# Patient Record
Sex: MALE | Race: WHITE | NOT HISPANIC OR LATINO | Employment: FULL TIME | URBAN - NONMETROPOLITAN AREA
[De-identification: names, ages, dates, MRNs, and addresses within clinical notes are randomized per-mention and may not be internally consistent; named-entity substitution may affect disease eponyms.]

---

## 2018-01-15 ENCOUNTER — OFFICE VISIT CONVERTED (OUTPATIENT)
Dept: FAMILY MEDICINE CLINIC | Age: 63
End: 2018-01-15
Attending: NURSE PRACTITIONER

## 2018-11-23 ENCOUNTER — OFFICE VISIT CONVERTED (OUTPATIENT)
Dept: FAMILY MEDICINE CLINIC | Age: 63
End: 2018-11-23
Attending: NURSE PRACTITIONER

## 2018-11-27 ENCOUNTER — OFFICE VISIT CONVERTED (OUTPATIENT)
Dept: FAMILY MEDICINE CLINIC | Age: 63
End: 2018-11-27
Attending: NURSE PRACTITIONER

## 2019-02-15 ENCOUNTER — HOSPITAL ENCOUNTER (OUTPATIENT)
Dept: OTHER | Facility: HOSPITAL | Age: 64
Discharge: HOME | End: 2019-02-15
Attending: NURSE PRACTITIONER

## 2019-02-15 ENCOUNTER — OFFICE VISIT CONVERTED (OUTPATIENT)
Dept: FAMILY MEDICINE CLINIC | Age: 64
End: 2019-02-15
Attending: NURSE PRACTITIONER

## 2019-02-17 LAB — BACTERIA SPEC AEROBE CULT: NORMAL

## 2020-12-22 ENCOUNTER — HOSPITAL ENCOUNTER (OUTPATIENT)
Dept: OTHER | Facility: HOSPITAL | Age: 65
Discharge: HOME | End: 2020-12-22
Attending: PHYSICIAN ASSISTANT

## 2021-01-11 ENCOUNTER — OFFICE VISIT CONVERTED (OUTPATIENT)
Dept: NEUROSURGERY | Facility: CLINIC | Age: 66
End: 2021-01-11
Attending: PHYSICIAN ASSISTANT

## 2021-02-08 ENCOUNTER — OFFICE VISIT CONVERTED (OUTPATIENT)
Dept: NEUROSURGERY | Facility: CLINIC | Age: 66
End: 2021-02-08
Attending: PHYSICIAN ASSISTANT

## 2021-03-16 ENCOUNTER — HOSPITAL ENCOUNTER (OUTPATIENT)
Dept: GENERAL RADIOLOGY | Facility: HOSPITAL | Age: 66
Discharge: HOME | End: 2021-03-16
Attending: PHYSICIAN ASSISTANT

## 2021-03-16 ENCOUNTER — OFFICE VISIT CONVERTED (OUTPATIENT)
Dept: NEUROSURGERY | Facility: CLINIC | Age: 66
End: 2021-03-16
Attending: PHYSICIAN ASSISTANT

## 2021-04-08 ENCOUNTER — HOSPITAL ENCOUNTER (OUTPATIENT)
Dept: OTHER | Facility: HOSPITAL | Age: 66
Discharge: HOME | End: 2021-04-08
Attending: PHYSICIAN ASSISTANT

## 2021-04-16 ENCOUNTER — HOSPITAL ENCOUNTER (OUTPATIENT)
Dept: OTHER | Facility: HOSPITAL | Age: 66
Discharge: HOME | End: 2021-04-16
Attending: PHYSICIAN ASSISTANT

## 2021-04-16 ENCOUNTER — OFFICE VISIT CONVERTED (OUTPATIENT)
Dept: NEUROSURGERY | Facility: CLINIC | Age: 66
End: 2021-04-16
Attending: PHYSICIAN ASSISTANT

## 2021-05-10 NOTE — H&P
History and Physical      Patient Name: Med Forbes   Patient ID: [id number]   Sex: Male   YOB: 1955    Referring Provider: Austen Sadler PA-C    Visit Date: January 11, 2021    Provider: Domonique Selby PA-C   Location: [facility]   Location Address: [address] [place] [address]  [id number]   Location Phone: [phone]          Chief Complaint  · Back pain      History Of Present Illness  The patient is a 65 year old /White male, who presents on referral from Austen Sadler PA-C, for a neurosurgical evaluation L1 transverse process fracture and age indeterminate L1 compression deformity s/p w/c injury. He fell off of a platform around 4-5 inches off the ground on 12/16/20. He stuck his left hand out and rotated and landed on the left side of his body. He has had back pain since that time. He has been off work since the date of the injury. Pain is currently a 5/10 and the rib pain is around a 5/10. Denies leg pain or weakness. Has some paresthesias in the the top of the right foot.   He denies weakness, changes in sensation in the legs, and bladder/bowel dysfunction. The patient has no prior history of neck or back surgery.   RECENT INTERVENTIONS:  He has been previously treated with pain medication.   INFORMATION REVIEWED:  The following information was reviewed: radiology reports and images. CT chest from [facility] from 12/16/20 revealed left lateral 11 the rib fracture, left L1 transverse process fractures, and age indeterminate L1 compression deformity. These were the most notable findings.       Past Medical History  Arthritis; Heart attack; High blood pressure; High cholesterol; Leg pain; Leg swelling; Low back pain; Thyroid Disease         Past Surgical History  heart surgery         Medication List  aspirin 325 mg oral tablet; citalopram 40 mg oral tablet; gabapentin 300 mg oral capsule; levothyroxine 150 mcg oral tablet; lisinopril 20 mg  "oral tablet; methocarbamol 500 mg oral tablet         Allergy List  NO KNOWN DRUG ALLERGIES       Allergies Reconciled  Family Medical History  Heart Disease; Arthrtis         Social History  Tobacco (Former)         Review of Systems  · Constitutional  o Admits  o : fatigue  o Denies  o : chills, excessive sweating, fever, sycope/passing out, weight gain, weight loss  · Eyes  o Denies  o : changes in vision, blurry vision, double vision  · HENT  o Admits  o : nasal congestion, sinus pain  o Denies  o : loss of hearing, ringing in the ears, ear aches, sore throat, nose bleeds, seasonal allergies  · Cardiovascular  o Admits  o : swollen legs  o Denies  o : blood clots, anemia, easy burising or bleeding, transfusions  · Respiratory  o Denies  o : shortness of breath, dry cough, productive cough, pneumonia, COPD  · Gastrointestinal  o Denies  o : difficulty swallowing, reflux  · Genitourinary  o Denies  o : incontinence  · Neurologic  o Admits  o : headache, stroke, falls, difficulty with sleep, numbness/tingling/paresthesia  o Denies  o : seizure, tremor, loss of balance, dizziness/vertigo, difficulty with coordination, difficulty with dexterity, weakness  · Musculoskeletal  o Admits  o : neck stiffness/pain, joint pain, low back pain  o Denies  o : swollen lymph nodes, muscle aches, weakness, spasms, sciatica, pain radiating in arm, pain radiating in leg  · Endocrine  o Admits  o : thyroid disorder  o Denies  o : diabetes  · Psychiatric  o Admits  o : anxiety, depression  · All Others Negative      Vitals  Date Time BP Position Site L\R Cuff Size HR RR TEMP (F) WT  HT  BMI kg/m2 BSA m2 O2 Sat FR L/min FiO2        01/11/2021 08:24 AM        96.7 209lbs 1oz 5'  9\" 30.87 2.15             Physical Examination  · Constitutional  o Appearance  o : well-nourished, well developed, alert, in no acute distress  · Respiratory  o Respiratory Effort  o : breathing unlabored  · Cardiovascular  o Peripheral Vascular System  o : "   § Extremities  § : no edema or cyanosis  · Musculoskeletal  o Spine  o :   § Inspection/Palpation  § : no spinal tenderness or misalignment  o Right Lower Extremity  o :   § Inspection/Palpation  § : no joint or limb tenderness to palpation, no edema present, no ecchymosis  § Joint Stability  § : joint stability within normal limits  § Range of Motion  § : range of motion normal, no joint crepitations present, no pain on motion, Dimitrios's test negative  o Left Lower Extremity  o :   § Inspection/Palpation  § : no joint or limb tenderness to palpation, no edema present, no ecchymosis  § Joint Stability  § : joint stability within normal limits  § Range of Motion  § : range of motion normal, no joint crepitations present, no pain on motion, Idmitrios's test negative  · Skin and Subcutaneous Tissue  o Extremities  o :   § Right Lower Extremity  § : no lesions or areas of discoloration  § Left Lower Extremity  § : no lesions or areas of discoloration  o Back  o : no lesions or areas of discoloration  · Neurologic  o Mental Status Examination  o :   § Orientation  § : alert and oriented to time, person, place and events  o Motor Examination  o :   § RLE Strength  § : strength normal  § RLE Motor Function  § : tone normal, no atrophy, no abnormal movements noted  § LLE Strength  § : strength normal  § LLE Motor Function  § : tone normal, no atrophy, no abnormal movements noted  o Reflexes  o :   § RLE  § : knee and ankle reflexes 2/4, SLR negative  § LLE  § : knee and ankle reflexes 2/4, SLR negative  o Sensation  o :   § Light Touch  § : sensation intact to light touch in extremities  o Gait and Station  o :   § Gait Screening  § : slow and using a cane  · Psychiatric  o Mood and Affect  o : mood normal, affect appropriate          Assessment  · Lumbago/low back pain     724.3/M54.40  · Lumbar compression fracture     805.4/S32.000A  L1 old vs new  · Lumbar transverse process fracture, closed, initial  encounter     805.4/S32.009A  left L1  · Rib fracture     807.00/S22.39XA  left 11th lateral  · Fall     E888.9/W19.XXXA      Plan  · Orders  o MRI lumbar spine wo contrast (77977) - 724.3/M54.40, 805.4/S32.009A, 805.4/S32.000A, E888.9/W19.XXXA - 01/11/2021   Flaget pleae per patient request. old vs new L1 compression deformity with known L1 left transverse process fracture s/p fall on 12/16/20  · Medications  o Medications have been Reconciled  o Transition of Care or Provider Policy  · Instructions  o Encouraged to follow-up with Primary Care Provider for preventative care.  o The ROS and the PFSH were reviewed at today's visit.  o Call or return to office if symptoms worsen or persist.  o This is a w/c injury. He has an old vs new L1 compression fracture and I would like a MRI lumbar spine to determine age of the fracture. He has a left L1 transverse process fracture which does not require bracing. He has a 11th lateral rib fracture. Off work until f/u visit after MRI lumbar spine. I will contact his  with update. He will be off work a minimum of 4 more weeks.   · Associate Tasks  o Task ID [id number] General Task: fax office note and work note to             Electronically Signed by: Domonique Selby PA-C -Author on January 11, 2021 10:53:49 AM

## 2021-05-13 ENCOUNTER — TRANSCRIBE ORDERS (OUTPATIENT)
Dept: ADMINISTRATIVE | Facility: HOSPITAL | Age: 66
End: 2021-05-13

## 2021-05-13 DIAGNOSIS — S63.611A SPRAIN OF LEFT INDEX FINGER, UNSPECIFIED SITE OF DIGIT, INITIAL ENCOUNTER: Primary | ICD-10-CM

## 2021-05-14 ENCOUNTER — HOSPITAL ENCOUNTER (OUTPATIENT)
Dept: OTHER | Facility: HOSPITAL | Age: 66
Discharge: HOME | End: 2021-05-14
Attending: PHYSICIAN ASSISTANT

## 2021-05-14 ENCOUNTER — OFFICE VISIT CONVERTED (OUTPATIENT)
Dept: NEUROSURGERY | Facility: CLINIC | Age: 66
End: 2021-05-14
Attending: PHYSICIAN ASSISTANT

## 2021-05-14 VITALS — WEIGHT: 216.06 LBS | TEMPERATURE: 98.1 F | BODY MASS INDEX: 32 KG/M2 | HEIGHT: 69 IN

## 2021-05-14 VITALS — BODY MASS INDEX: 30.96 KG/M2 | HEIGHT: 69 IN | WEIGHT: 209.06 LBS | TEMPERATURE: 96.7 F

## 2021-05-14 VITALS — TEMPERATURE: 96.9 F | BODY MASS INDEX: 30.69 KG/M2 | HEIGHT: 69 IN | WEIGHT: 207.19 LBS

## 2021-05-14 VITALS — TEMPERATURE: 97.2 F | BODY MASS INDEX: 31.12 KG/M2 | WEIGHT: 210.12 LBS | HEIGHT: 69 IN

## 2021-05-14 NOTE — PROGRESS NOTES
Progress Note      Patient Name: Med Forbes   Patient ID: [id number]   Sex: Male   YOB: 1955    Referring Provider: Austen Sadler PA-C    Visit Date: March 16, 2021    Provider: Domonique Selby PA-C   Location: [facility] [place] [facility]   Location Address: [address] [place] [address]  [id number]   Location Phone: [phone]          Chief Complaint     Follow up with lumbar x-ray done at [facility].       History Of Present Illness     Here for f/u for his w/c injury.  He has a L1 compression fracture and left L1 and L2 transverse process fractures s/p a fall in December 2020 while at work.  Xrays of lumbar spine from today showed a stable L1 compression fracture.  He has been tolerating his TLSO brace and wearing it when out of bed.  He has been back to work with restrictions, but wearing his brace and worked for a few days then pain got worse and he was sent home from work by the nurse. He went back to work last Monday.  Pain is still around a 6-7/10. He has been in brace only around one week since he had Covid and couldn't get the brace until recently.       Past Medical History  Arthritis; Heart attack; High blood pressure; High cholesterol; Leg pain; Leg swelling; Low back pain; Thyroid Disease         Past Surgical History  heart surgery         Medication List  aspirin 325 mg oral tablet; citalopram 40 mg oral tablet; gabapentin 300 mg oral capsule; levothyroxine 150 mcg oral tablet; lisinopril 20 mg oral tablet; methocarbamol 500 mg oral tablet         Allergy List  NO KNOWN DRUG ALLERGIES       Allergies Reconciled  Family Medical History  Heart Disease; Arthrtis         Social History  Tobacco (Former)         Review of Systems  · Constitutional  o Denies  o : chills, excessive sweating, fatigue, fever, sycope/passing out, weight gain, weight loss  · Eyes  o Admits  o : changes in vision, blurry vision  o Denies  o : double vision  · HENT  o Admits  o : sinus  "pain, seasonal allergies  o Denies  o : loss of hearing, ringing in the ears, ear aches, sore throat, nasal congestion, nose bleeds  · Cardiovascular  o Admits  o : anemia, easy burising or bleeding  o Denies  o : blood clots, swollen legs, transfusions  · Respiratory  o Denies  o : shortness of breath, dry cough, productive cough, pneumonia, COPD  · Gastrointestinal  o Denies  o : difficulty swallowing, reflux  · Genitourinary  o Denies  o : incontinence  · Neurologic  o Admits  o : headache, stroke, falls  o Denies  o : tremor, loss of balance, dizziness/vertigo, difficulty with sleep, numbness/tingling/paresthesia , difficulty with coordination, difficulty with dexterity, weakness  · Musculoskeletal  o Admits  o : neck stiffness/pain, joint pain, spasms, pain radiating in leg, low back pain  o Denies  o : swollen lymph nodes, muscle aches, weakness, sciatica, pain radiating in arm  · Endocrine  o Admits  o : thyroid disorder  o Denies  o : diabetes  · Psychiatric  o Admits  o : depression  o Denies  o : anxiety  · All Others Negative      Vitals  Date Time BP Position Site L\R Cuff Size HR RR TEMP (F) WT  HT  BMI kg/m2 BSA m2 O2 Sat FR L/min FiO2 HC       03/16/2021 08:45 AM        96.9 207lbs 3oz 5'  9\" 30.6 2.14             Physical Examination     Using a cane  Wearing TLSO brace  Motor 5/5 in lower extremities  No clonus           Assessment  · Lumbago/low back pain     724.3/M54.40  · Lumbar compression fracture     805.4/S32.000A  L1 new   · Lumbar transverse process fracture, closed, initial encounter     805.4/S32.009A  left L1  · Rib fracture     807.00/S22.39XA  left 11th lateral  · Fall     E888.9/W19.XXXA      Plan  · Orders  o Lumbar Spine AP and Lateral X-Ray [facility] ([id number]) - 724.3/M54.40, 805.4/S32.009A, 805.4/S32.000A - 03/16/2021   L1 compression fracture to assess healing status  · Medications  o Medications have been Reconciled  o Transition of Care or Provider Policy  · Instructions  o I will " keep him off work until f/u visit in 4 weeks. His limitations are absolutely no climbing, no repetitive bending/twisting at the waist, wear TLSO brace, and no lifting greater than 10 pounds. Pain worse while working recently so will remain off work until f/u visit. Will need lumbar xrays prior to f/u visit in four weeks. Continue to wear TLSO brace when upright past 30 degrees and when out of bed.             Electronically Signed by: Domonique Selby PA-C -Author on March 16, 2021 08:58:18 AM

## 2021-05-14 NOTE — PROGRESS NOTES
Progress Note      Patient Name: Med Forbes   Patient ID: [id number]   Sex: Male   YOB: 1955    Referring Provider: Austen Sadler PA-C    Visit Date: February 8, 2021    Provider: Domonique Selby PA-C   Location: [facility] [place] [facility]   Location Address: [address] [place] [address]  [id number]   Location Phone: [phone]          Chief Complaint     Follow up with MRI Lumbar spine done at [facility].       History Of Present Illness  The patient is a 65 year old /White male who is in the office for followup appointment.      F/U visit.  MRI lumbar spine showed acute/subacute L1 inferior endplate compression fracture with around 25% height loss without retropulsion.  Schmorl's node at L2.  Left L1 and L2 transverse process fractures.  Low back pain is around a 6/10.  This is a w/c injury.  He had a fall.  Having pain in the left suprascapular region over the next thee weeks and describes as sharp pain.    He has loop recorder in place.       Past Medical History  Arthritis; Heart attack; High blood pressure; High cholesterol; Leg pain; Leg swelling; Low back pain; Thyroid Disease         Past Surgical History  heart surgery         Medication List  aspirin 325 mg oral tablet; citalopram 40 mg oral tablet; gabapentin 300 mg oral capsule; levothyroxine 150 mcg oral tablet; lisinopril 20 mg oral tablet; methocarbamol 500 mg oral tablet         Allergy List  NO KNOWN DRUG ALLERGIES       Allergies Reconciled  Family Medical History  Heart Disease; Arthrtis         Social History  Tobacco (Former)         Review of Systems  · Constitutional  o Admits  o : fatigue, weight gain  o Denies  o : chills, excessive sweating, fever, sycope/passing out, weight loss  · Eyes  o Admits  o : changes in vision, blurry vision  o Denies  o : double vision  · HENT  o Admits  o : nasal congestion, sinus pain  o Denies  o : loss of hearing, ringing in the ears, ear aches, sore  "throat, nose bleeds, seasonal allergies  · Cardiovascular  o Denies  o : blood clots, swollen legs, anemia, easy burising or bleeding, transfusions  · Respiratory  o Denies  o : shortness of breath, dry cough, productive cough, pneumonia, COPD  · Gastrointestinal  o Denies  o : difficulty swallowing, reflux  · Genitourinary  o Denies  o : incontinence  · Neurologic  o Admits  o : headache, stroke, loss of balance, falls, numbness/tingling/paresthesia   o Denies  o : seizure, tremor, dizziness/vertigo, difficulty with sleep, difficulty with coordination, difficulty with dexterity, weakness  · Musculoskeletal  o Admits  o : joint pain, pain radiating in arm, low back pain  o Denies  o : neck stiffness/pain, swollen lymph nodes, muscle aches, weakness, spasms, sciatica, pain radiating in leg  · Endocrine  o Admits  o : thyroid disorder  o Denies  o : diabetes  · Psychiatric  o Admits  o : anxiety, depression  · All Others Negative      Vitals  Date Time BP Position Site L\R Cuff Size HR RR TEMP (F) WT  HT  BMI kg/m2 BSA m2 O2 Sat FR L/min FiO2 HC       02/08/2021 09:54 AM        98.1 216lbs 1oz 5'  9\" 31.91 2.18             Physical Examination  · Constitutional  o Appearance  o : well-nourished, well developed, alert, in no acute distress  · Respiratory  o Respiratory Effort  o : breathing unlabored  · Cardiovascular  o Peripheral Vascular System  o :   § Extremities  § : no cyanosis, clubbing or edema  · Neurologic  o Mental Status Examination  o :   § Orientation  § : grossly oriented to person, place and time  o Motor Examination  o :   § RLE Strength  § : strength normal  § RLE Motor Function  § : tone normal, no atrophy, no abnormal movements noted  § LLE Strength  § : strength normal  § LLE Motor Function  § : tone normal, no atrophy, no abnormal movements noted  o Sensation  o :   § Light Touch  § : sensation intact to light touch in extremities  o Gait and Station  o :   § Gait Screening  § : slow and using a " cane  · Psychiatric  o Mood and Affect  o : mood normal, affect appropriate     ttp in the left suprascapular region    TTP in the midline over the L1 region and lower lumbar region             Assessment  · Lumbago/low back pain     724.3/M54.40  · Lumbar compression fracture     805.4/S32.000A  L1 new   · Lumbar transverse process fracture, closed, initial encounter     805.4/S32.009A  left L1  · Rib fracture     807.00/S22.39XA  left 11th lateral  · Fall     E888.9/W19.XXXA      Plan  · Orders  o Lumbar Spine AP and Lateral X-Ray [facility] ([id number]) - 724.3/M54.40, 805.4/S32.009A, 805.4/S32.000A, E888.9/W19.XXXA - 02/08/2021   L1 compression fracture   · Medications  o Medications have been Reconciled  o Transition of Care or Provider Policy  · Instructions  o Encouraged to follow-up with Primary Care Provider for preventative care.  o The ROS and the PFSH were reviewed at today's visit.  o Call or return to office if symptoms worsen or persist.   o He has an acute/subacute L1 compression fracture and transverse process fractures at L1 and L2. I will see him back in four weeks with lumbar spine xrays. Order given for a brace. He may work with restrictions of no lifting greater than 10 pounds, no repetitive bending/twisting at the waist, and no climbing. He must wear the TLSO brace.   · Associate Tasks  o Task ID [id number] General Task: fax TLSO brace order, office note, work note and MRI lumbar spine report to AttN: Grazyna [phone]            Electronically Signed by: CHRISTIANO Singh-NIECY -Author on February 8, 2021 10:41:40 AM  Electronically Co-signed by: Wilver Groves -Reviewer on February 8, 2021 10:44:03 AM

## 2021-05-18 NOTE — PROGRESS NOTES
Med Forbes 1955     Office/Outpatient Visit    Visit Date: Fri, Nov 23, 2018 02:44 pm    Provider: Rosa Colon N.P. (Assistant: Esperanza Calzada MA)    Location: [facility]        Electronically signed by Rosa Colon N.P. on  11/23/2018 03:43:08 PM                             SUBJECTIVE:        CC:     Mr. Forbes is a 63 year old White male.  presents today due to complaints of cough, chest congestion, drainage, headache X 2 days         [facility]         Upper respiratory illness noted.  These have been present for the past one week.  The symptoms include chest congestion, productive cough, headache and sinus pain/pressure.  He denies fever.  He has already tried to relieve the symptoms with mixed cold/sinus preparations.      ROS:     CONSTITUTIONAL:  Negative for chills and fever.      EYES:  Negative for blurred vision and eye drainage.      E/N/T:  Positive for ear pain and sinus pressure.      CARDIOVASCULAR:  Negative for chest pain and palpitations.      RESPIRATORY:  Positive for recent cough.   Negative for dyspnea or frequent wheezing.      GASTROINTESTINAL:  Negative for abdominal pain and vomiting.          [facility]/:     Last Reviewed on 1/15/2018 03:26 PM by Rosa Colon    Past Medical History:             PAST MEDICAL HISTORY         Myocardial Infarction: in 2006; complications included had a stent placed;     Fracture(s): right tibia and fibula , caused by kick from a horse;     Hypothyroidism: dx'd at age goitor history and thyroid was removed;     Allergies: seasonal;     Depression: treated now;     Hospitalizations: Never         CURRENT MEDICAL PROVIDERS:    Allergist: dr Miles rd for chronic care         PREVENTIVE HEALTH MAINTENANCE             COLORECTAL CANCER SCREENING: Up to date (colonoscopy q10y; sigmoidoscopy q5y; Cologuard q3y) [facility]; [facility] with normal results         Surgical History:         rotor cuff, left  shoulder;    thyroid removed; Procedures: cardiac stent frozen shoulder 2010         Family History:     Father: Medical history unknown     Mother: Hypertension; Hyperlipidemia; Myocardial Infarction ( three stents )     Brother(s): 2 brother(s) total;  two stents     Sister(s): 2 sister(s) total;  COPD         Social History:     Occupation: [employer]     Marital Status:          Tobacco/Alcohol/Supplements:     Last Reviewed on 1/15/2018 03:26 PM by Rosa Colon    Tobacco: He has a past history of cigarette smoking; quit date:  1997.          Substance Abuse History:     Last Reviewed on 1/15/2018 03:26 PM by Rosa Colon        Mental Health History:     Last Reviewed on 1/15/2018 03:26 PM by Rosa Colon        Communicable Diseases (eg STDs):     Last Reviewed on 1/15/2018 03:26 PM by Rosa Colon            Current Problems:     Last Reviewed on 1/15/2018 03:26 PM by Rosa Colon    Allergic rhinitis         Immunizations:     None        Allergies:     Last Reviewed on 1/15/2018 03:26 PM by Rosa Colon      No Known Drug Allergies.         Current Medications:     Last Reviewed on 1/15/2018 03:26 PM by Rosa Colon    Levothyroxine Sodium 0.175mg Tablet One Po QD     Aspirin (ASA) 325mg Caplet One a day     Atenolol 100mg Tablet one a day     Lisinopril 10mg Tablet 1 tab in moring daily     Benadryl Allergy     Fluticasone Propionate 50mcg/1actuation Nasal Spray 1 or 2 sprays in each nostril qday prn     Atorvastatin Calcium 20mg Tablet 1 tab daily     Singulair  10mg Tablet 1 TAB DAILY     Symbicort 160/4.5 Oral Inhaler 1 puff daily     Loratadine 10mg Tablet 1 TAB DAILY     Ibuprofen 800mg Tablet 1 q 8hours         OBJECTIVE:        Vitals:         Current: 11/23/2018 2:49:01 PM    Ht:  5 ft, 10 in;  Wt: 213.4 lbs;  BMI: 30.6    T: 97.9 F (oral);  BP: 161/73 mm Hg (left arm, sitting);  P: 52 bpm (left arm (BP Cuff), sitting)        Repeat:     2:50:11 PM     BP:    155/73mm Hg (left arm, sitting)         Exams:     PHYSICAL EXAM:     GENERAL: well developed, well nourished;  no apparent distress;     EYES: PERRL, EOMI     E/N/T: EARS: external auditory canal normal;  both TMs are dull;  NOSE: normal turbinates; bilateral maxillary and bilateral frontal sinus tenderness present; OROPHARYNX: oral mucosa is normal; posterior pharynx shows no exudate;     NECK: range of motion is normal; trachea is midline;     RESPIRATORY: normal respiratory rate and pattern with no distress; normal breath sounds with no rales, rhonchi, wheezes or rubs;     CARDIOVASCULAR: normal rate; rhythm is regular;     MUSCULOSKELETAL: normal gait;     NEUROLOGIC: mental status: alert and oriented x 3; GROSSLY INTACT     PSYCHIATRIC: appropriate affect and demeanor;         Procedures:     Influenza vaccination     1. Influenza, seasonal PF (children 3 years to adult): 0.5 ml unit dose given IM in the left upper arm; administered by AS;  lot number [id number]; expires 06/30/2019 Regarding contraindications to an Influenza vaccine: Denies moderate/severe illness with/without fever; serious reaction to eggs, egg proteins, gentamicin, gelatin, arginine, neomycin or polymixin; serious reaction after recieving previous influenza vaccines; and history of Guillain-[place] Syndrome.              ASSESSMENT           461.8   J01.90  Acute sinusitis, other              DDx:     V04.81   Z23  Influenza vaccination              DDx:     401.1   I10  Hypertension              DDx:         ORDERS:         Meds Prescribed:       Amoxicillin 875mg Tablet One PO BID X 10 days.  #20 (Twenty) tablet(s) Refills: 0       Tessalon Perles (Benzonatate) 100mg Capsules One PO Q 8 hours PRN cough  #30 (Thirty) capsule(s) Refills: 0         Procedures Ordered:       50078  Immunization administration; one vaccine  (In-House)           Other Orders:       55320  Influenza virus vaccine, quadrivalent, split virus, preservative free 3 years  of age & older  (In-House)                   PLAN:          Acute sinusitis, other         RECOMMENDATIONS given include: Push Fluids, Rest, Follow up if no improvement or worsening symptoms like high fevers, vomiting, weakness, or increasing shortness of air.    .      FOLLOW-UP: Schedule follow-up appointments on a p.r.n. basis. for Continued follow up with PCP           Prescriptions:       Amoxicillin 875mg Tablet One PO BID X 10 days.  #20 (Twenty) tablet(s) Refills: 0       Tessalon Perles (Benzonatate) 100mg Capsules One PO Q 8 hours PRN cough  #30 (Thirty) capsule(s) Refills: 0          Influenza vaccination         IMMUNIZATIONS given today: Influenza Quadrivalent psv free shot 3 & up.            Orders:       65544  Influenza virus vaccine, quadrivalent, split virus, preservative free 3 years of age & older  (In-House)         96059  Immunization administration; one vaccine  (In-House)            Hypertension [facility] Advised to continue follow up per their recommendations.             Patient Recommendations:        For  Acute sinusitis, other:     Schedule follow-up appointments as needed.              CHARGE CAPTURE           **Please note: ICD descriptions below are intended for billing purposes only and may not represent clinical diagnoses**        Primary Diagnosis:         461.8 Acute sinusitis, other            J01.90    Acute sinusitis, unspecified              Orders:          30887   Office/outpatient visit; established patient, level 3  (In-House)           V04.81 Influenza vaccination            Z23    Encounter for immunization              Orders:          91305   Influenza virus vaccine, quadrivalent, split virus, preservative free 3 years of age & older  (In-House)             19283   Immunization administration; one vaccine  (In-House)           401.1 Hypertension            I10    Essential (primary) hypertension

## 2021-05-18 NOTE — PROGRESS NOTES
Med Forbes 1955     Office/Outpatient Visit    Visit Date: Fri, Feb 15, 2019 11:25 am    Provider: Rosa Colon N.P. (Assistant: Esperanza Calzada MA)    Location: [facility]        Electronically signed by Rosa Colon N.P. on  02/15/2019 12:46:16 PM                             SUBJECTIVE:        CC:     Mr. Forbes is a 63 year old White male.  presents today due to complaints of left ear pain, sore throat, body aches, fatigue X 2 days         [facility] Reports that he is scheduled to have 48 hour Holter at the end of this month. They are monitoring and adjusting BP medications.         Patient to be evaluated for upper respiratory illness.  These have been present for the past 3 days.  The symptoms include body aches, ear pain,  fatigue and sore throat.  He has already tried to relieve the symptoms with ibuprofen and Coricidin HBP.      ROS:     CONSTITUTIONAL:  Positive for fatigue and body aches.      EYES:  Negative for blurred vision and eye drainage.      E/N/T:  Positive for ear pain ( left ) and sore throat.      CARDIOVASCULAR:  Negative for chest pain and palpitations.      RESPIRATORY:  Positive for recent cough ( typically dry ).   Negative for dyspnea or frequent wheezing.          PMH/FMH/SH:     Last Reviewed on 1/15/2018 03:26 PM by Rosa Colno    Past Medical History:             PAST MEDICAL HISTORY         Myocardial Infarction: in 2006; complications included had a stent placed;     Fracture(s): right tibia and fibula , caused by kick from a horse;     Hypothyroidism: dx'd at age goitor history and thyroid was removed;     Allergies: seasonal;     Depression: treated now;     Hospitalizations: Never         CURRENT MEDICAL PROVIDERS:    Allergist: dr Miles rd for chronic care         PREVENTIVE HEALTH MAINTENANCE             COLORECTAL CANCER SCREENING: Up to date (colonoscopy q10y; sigmoidoscopy q5y; Cologuard q3y) [facility];  colonoscopy with normal results         Surgical History:         rotor cuff, left shoulder;    thyroid removed; Procedures: cardiac stent frozen shoulder 2010         Family History:     Father: Medical history unknown     Mother: Hypertension; Hyperlipidemia; Myocardial Infarction ( three stents )     Brother(s): 2 brother(s) total;  two stents     Sister(s): 2 sister(s) total;  COPD         Social History:     Occupation: [employer]     Marital Status:          Tobacco/Alcohol/Supplements:     Last Reviewed on 11/27/2018 04:33 PM by Luh Mckoy    Tobacco: He has a past history of cigarette smoking; quit date:  1997.          Substance Abuse History:     Last Reviewed on 1/15/2018 03:26 PM by Rosa Colon        Mental Health History:     Last Reviewed on 1/15/2018 03:26 PM by Rosa Colon        Communicable Diseases (eg STDs):     Last Reviewed on 1/15/2018 03:26 PM by Rosa Colon            Current Problems:     Last Reviewed on 1/15/2018 03:26 PM by Rosa Colon    Hypertension     Allergic rhinitis         Immunizations:     Fluzone Quadrivalent (3+ years) 11/23/2018         Allergies:     Last Reviewed on 11/27/2018 04:32 PM by Luh Mckoy      No Known Drug Allergies.         Current Medications:     Last Reviewed on 11/27/2018 04:32 PM by Luh Mckoy    Levothyroxine Sodium 0.175mg Tablet One Po QD     Aspirin (ASA) 325mg Caplet One a day     Atenolol 100mg Tablet one a day     Lisinopril 10mg Tablet 1 tab in moring daily     Benadryl Allergy     Fluticasone Propionate 50mcg/1actuation Nasal Spray 1 or 2 sprays in each nostril qday prn     Atorvastatin Calcium 20mg Tablet 1 tab daily     Symbicort 160/4.5 Oral Inhaler 1 puff daily     Loratadine 10mg Tablet 1 TAB DAILY     Ibuprofen 800mg Tablet 1 q 8hours         OBJECTIVE:        Vitals:         Current: 2/15/2019 11:29:51 AM    Ht:  5 ft, 10 in;  Wt: 206.8 lbs;  BMI: 29.7    T: 98  F (oral);  BP: 151/71 mm Hg (left arm, sitting);  P: 56 bpm (left arm (BP Cuff), sitting)        Repeat:     11:29:59 AM     BP:   152/67mm Hg (left arm, sitting)         Exams:     PHYSICAL EXAM:     GENERAL: well developed, well nourished;  no apparent distress;     EYES: PERRL, EOMI     E/N/T: EARS: external auditory canal normal;  both TMs are dull;  NOSE: normal turbinates; no sinus tenderness; OROPHARYNX: oral mucosa is normal; posterior pharynx shows no exudate and post nasal drip;     NECK: range of motion is normal; trachea is midline;     RESPIRATORY: normal respiratory rate and pattern with no distress; normal breath sounds with no rales, rhonchi, wheezes or rubs;     CARDIOVASCULAR: normal rate; rhythm is regular;     MUSCULOSKELETAL: normal gait;     NEUROLOGIC: mental status: alert and oriented x 3; GROSSLY INTACT     PSYCHIATRIC: appropriate affect and demeanor;         Lab/Test Results:             Influenza A and B:  Negative (02/15/2019),     Performed by::  tls (02/15/2019),     Rapid Strep Screen:  Negative (02/15/2019),             ASSESSMENT           465.9   J06.9  Acute upper respiratory infection              DDx:     401.1   I10  Hypertension              DDx:         ORDERS:         Lab Orders:       [id number]  Infectious agent antigen detection by immunoassay; Influenza  (In-House)         84047  Infectious agent antigen detection by immunoassay; Influenza  (In-House)         96045  Group A Streptococcus detection by immunoassay with direct optical observation  (In-House)         18828  [facility] Throat culture, strep  (Send-Out)                   PLAN:          Acute upper respiratory infection         RECOMMENDATIONS given include: Push Fluids, Rest, Follow up if no improvement or worsening symptoms like high fevers, vomiting, weakness, or increasing shortness of air.    , Tylenol, Ibuprofen per package instructions., May continue Coricidin per package instructions, and Declines rx for  cough medication..      FOLLOW-UP: Schedule follow-up appointments on a p.r.n. basis. Chronic visit follow up School/Work Excuse for Yesterday Today           Orders:       [id number]  Infectious agent antigen detection by immunoassay; Influenza  (In-House)         42045  Infectious agent antigen detection by immunoassay; Influenza  (In-House)         28110  Group A Streptococcus detection by immunoassay with direct optical observation  (In-House)         97561  [facility] Throat culture, strep  (Send-Out)            Hypertension Advised BP elevated today. [facility].             Patient Recommendations:        For  Acute upper respiratory infection:     Schedule follow-up appointments as needed.              CHARGE CAPTURE           **Please note: ICD descriptions below are intended for billing purposes only and may not represent clinical diagnoses**        Primary Diagnosis:         465.9 Acute upper respiratory infection            J06.9    Acute upper respiratory infection, unspecified              Orders:          [id number]   Office/outpatient visit; established patient, level 3  (In-House)             56890 -59  Infectious agent antigen detection by immunoassay; Influenza  (In-House)             74476   Infectious agent antigen detection by immunoassay; Influenza  (In-House)             44590   Group A Streptococcus detection by immunoassay with direct optical observation  (In-House)           401.1 Hypertension            I10    Essential (primary) hypertension

## 2021-05-18 NOTE — PROGRESS NOTES
Med Forbes 1955     Office/Outpatient Visit    Visit Date: Mon, Brent 15, 2018 03:05 pm    Provider: Rosa Colon N.P. (Assistant: Gabriela Graves MA)    Location: [facility]        Electronically signed by Rosa Colon N.P. on  01/16/2018 05:38:37 PM                             SUBJECTIVE:        CC:     Mr. Forbes is a 62 year old White male.  Patient complains of sinus drianage and cough.          [facility]         Upper respiratory illness noted.  These have been present for the past 5 days.  The symptoms include cough, headache and sinus pain/pressure.  He has already tried to relieve the symptoms with Advil.      ROS:     CONSTITUTIONAL:  Negative for chills and fever.      EYES:  Negative for blurred vision and eye drainage.      E/N/T:  Positive for sinus pressure.   Negative for ear pain.      CARDIOVASCULAR:  Negative for chest pain and palpitations.      RESPIRATORY:  Positive for recent cough.   Negative for dyspnea or frequent wheezing.      GASTROINTESTINAL:  Negative for abdominal pain and vomiting.          [facility]/:     Last Reviewed on 2/19/2016 08:58 AM by Beverley Rosas    Past Medical History:             PAST MEDICAL HISTORY         Myocardial Infarction: in 2006; complications included had a stent placed;     Fracture(s): right tibia and fibula , caused by kick from a horse;     Hypothyroidism: dx'd at age goitor history and thyroid was removed;     Allergies: seasonal;     Depression: treated now;     Hospitalizations: Never         CURRENT MEDICAL PROVIDERS:    Allergist: dr Miles rd for chronic care         Surgical History:         rotor cuff, left shoulder;    thyroid removed; Procedures: cardiac stent frozen shoulder 2010         Family History:     Father: Medical history unknown     Mother: Hypertension; Hyperlipidemia; Myocardial Infarction ( three stents )     Brother(s): 2 brother(s) total;  two stents     Sister(s): 2 sister(s) total;   COPD         Social History:     Occupation: [employer]     Marital Status:          Tobacco/Alcohol/Supplements:     Last Reviewed on 1/15/2018 03:07 PM by Gabriela Graves    Tobacco: He has a past history of cigarette smoking; quit date:  1997.              Current Problems:     Allergic rhinitis     Acute bronchitis         Immunizations:     None        Allergies:     Last Reviewed on 1/15/2018 03:05 PM by Gabriela Graves      No Known Drug Allergies.         Current Medications:     Last Reviewed on 1/15/2018 03:05 PM by Gabriela Graves    Levothyroxine Sodium 0.175mg Tablet One Po QD     Aspirin (ASA) 325mg Caplet One a day     Atenolol 100mg Tablet one a day     Benadryl Allergy     Fluticasone Propionate 50mcg/1actuation Nasal Spray 1 or 2 sprays in each nostril qday prn     Atorvastatin Calcium 20mg Tablet 1 tab daily     Singulair  10mg Tablet 1 TAB DAILY     Symbicort 160/4.5 Oral Inhaler 1 puff daily     Loratadine 10mg Tablet 1 TAB DAILY     Ibuprofen 800mg Tablet 1 q 8hours         OBJECTIVE:        Vitals:         Current: 1/15/2018 3:07:11 PM    Ht:  5 ft, 10 in;  Wt: 207.3 lbs;  BMI: 29.7    T: 98 F (oral);  BP: 137/82 mm Hg (left arm, sitting);  P: 72 bpm (left arm (BP Cuff), sitting)        Exams:     PHYSICAL EXAM:     GENERAL: well developed, well nourished;  no apparent distress;     EYES: PERRL, EOMI     E/N/T: EARS: external auditory canal normal;  both TMs are dull;  NOSE: normal turbinates; no sinus tenderness; OROPHARYNX: oral mucosa is normal; posterior pharynx shows no exudate;     RESPIRATORY: normal respiratory rate and pattern with no distress; normal breath sounds with no rales, rhonchi, wheezes or rubs;     CARDIOVASCULAR: normal rate; rhythm is regular;     NEUROLOGIC: mental status: alert and oriented x 3; GROSSLY INTACT     PSYCHIATRIC: appropriate affect and demeanor;         ASSESSMENT           465.9   J06.9  Acute upper respiratory  infection              DDx:         ORDERS:         Meds Prescribed:       Mucinex (Guaifenesin) 600mg Tablets, Extended Release 1 bid prn  #60 (Sixty) tablet(s) Refills: 0       Tessalon Perles (Benzonatate) 100mg Capsules One PO Q 8 hours PRN cough  #30 (Thirty) capsule(s) Refills: 0                 PLAN:          Acute upper respiratory infection         RECOMMENDATIONS given include: Push Fluids, Rest, Follow up if no improvement or worsening symptoms like high fevers, vomiting, weakness, or increasing shortness of air.    .      FOLLOW-UP: Schedule follow-up appointments on a p.r.n. basis.            Prescriptions: Advised that cough medication may cause drowsiness.       Mucinex (Guaifenesin) 600mg Tablets, Extended Release 1 bid prn  #60 (Sixty) tablet(s) Refills: 0       Tessalon Perles (Benzonatate) 100mg Capsules One PO Q 8 hours PRN cough  #30 (Thirty) capsule(s) Refills: 0             CHARGE CAPTURE           **Please note: ICD descriptions below are intended for billing purposes only and may not represent clinical diagnoses**        Primary Diagnosis:         465.9 Acute upper respiratory infection            J06.9    Acute upper respiratory infection, unspecified              Orders:          [id number]   Office/outpatient visit; established patient, level 3  (In-House)

## 2021-05-18 NOTE — PROGRESS NOTES
Med Forbes 1955     Office/Outpatient Visit    Visit Date: Tue, Nov 27, 2018 04:32 pm    Provider: Lorenza Bhatti N.P. (Assistant: Luh Mckoy LPN)    Location: [facility]        Electronically signed by Lorenza Bhatti N.P. on  11/29/2018 08:49:11 AM                             SUBJECTIVE:        CC:     Mr. Forbes is a 63 year old White male.  He presents with cold symptoms and no better.          HPI:         Patient to be evaluated for acute upper respiratory infection.  These have been present for the past one week.  The symptoms include cough, ear complaints, headache, nasal congestion, sinus pain/pressure and sputum production.  He denies fever.  He denies exposure to ill contacts.  He has already tried to relieve the symptoms with amoxicillin and benzonatate (not working).  Medical history is significant for moderate allergies and asthma.      ROS:     CONSTITUTIONAL:  Negative for chills, fatigue and fever.      E/N/T:  Positive for sinus pressure.      CARDIOVASCULAR:  Negative for chest pain, orthopnea, paroxysmal nocturnal dyspnea and pedal edema.      RESPIRATORY:  Positive for recent cough and dyspnea.      GASTROINTESTINAL:  Negative for abdominal pain, heartburn, constipation, diarrhea, and stool changes.      NEUROLOGICAL:  Positive for headaches.      PSYCHIATRIC:  Negative for anxiety and depression.          PMH/FMH/SH:     Last Reviewed on 1/15/2018 03:26 PM by Rosa Colon    Past Medical History:             PAST MEDICAL HISTORY         Myocardial Infarction: in 2006; complications included had a stent placed;     Fracture(s): right tibia and fibula , caused by kick from a horse;     Hypothyroidism: dx'd at age goitor history and thyroid was removed;     Allergies: seasonal;     Depression: treated now;     Hospitalizations: Never         CURRENT MEDICAL PROVIDERS:    Allergist: dr Jd claire for chronic care         PREVENTIVE HEALTH MAINTENANCE              COLORECTAL CANCER SCREENING: Up to date (colonoscopy q10y; sigmoidoscopy q5y; Cologuard q3y) was last done [facility]; colonoscopy with normal results         Surgical History:         rotor cuff, left shoulder;    thyroid removed; Procedures: cardiac stent frozen shoulder 2010         Family History:     Father: Medical history unknown     Mother: Hypertension; Hyperlipidemia; Myocardial Infarction ( three stents )     Brother(s): 2 brother(s) total;  two stents     Sister(s): 2 sister(s) total;  COPD         Social History:     Occupation: [employer]     Marital Status:          Tobacco/Alcohol/Supplements:     Last Reviewed on 11/23/2018 02:46 PM by Esperanza Calzada    Tobacco: He has a past history of cigarette smoking; quit date:  1997.          Substance Abuse History:     Last Reviewed on 1/15/2018 03:26 PM by Rosa Colon        Mental Health History:     Last Reviewed on 1/15/2018 03:26 PM by Rosa Colon        Communicable Diseases (eg STDs):     Last Reviewed on 1/15/2018 03:26 PM by Rosa Colon            Current Problems:     Last Reviewed on 1/15/2018 03:26 PM by Rosa Colon    Hypertension     Allergic rhinitis     Acute sinusitis, other         Immunizations:     Fluzone Quadrivalent (3+ years) 11/23/2018         Allergies:     Last Reviewed on 11/23/2018 02:46 PM by Esperanza Calzada      No Known Drug Allergies.         Current Medications:     Last Reviewed on 11/23/2018 02:46 PM by Esperanza Calzada    Levothyroxine Sodium 0.175mg Tablet One Po QD     Aspirin (ASA) 325mg Caplet One a day     Atenolol 100mg Tablet one a day     Amoxicillin 875mg Tablet One PO BID X 10 days.     Tessalon Perles 100mg Capsules One PO Q 8 hours PRN cough     Lisinopril 10mg Tablet 1 tab in moring daily     Benadryl Allergy     Fluticasone Propionate 50mcg/1actuation Nasal Spray 1 or 2 sprays in each nostril qday prn     Atorvastatin Calcium 20mg Tablet 1 tab daily      Symbicort 160/4.5 Oral Inhaler 1 puff daily     Loratadine 10mg Tablet 1 TAB DAILY     Ibuprofen 800mg Tablet 1 q 8hours         OBJECTIVE:        Vitals:         Current: 11/27/2018 4:36:01 PM    Ht:  5 ft, 10 in;  Wt: 206.6 lbs;  BMI: 29.6    T: 98 F (oral);  BP: 121/68 mm Hg (left arm, sitting);  P: 61 bpm (left arm (BP Cuff), sitting)        Exams:     PHYSICAL EXAM:     GENERAL: Vitals recorded well developed, well nourished;  well groomed;  no apparent distress, appears moderately ill;     NECK:  supple, full ROM; no thyromegaly; no carotid bruits;     RESPIRATORY: normal respiratory rate and pattern with no distress; coarse breath sounds throughout; expiratory wheezes in the CATHIE and RUL;     CARDIOVASCULAR: normal rate; rhythm is regular;  normal S1; normal S2; no systolic murmur; no cyanosis; no edema;     MUSCULOSKELETAL:  Normal range of motion, strength and tone;     NEUROLOGICAL:  cranial nerves, motor and sensory function, reflexes, gait and coordination are all intact;     PSYCHIATRIC:  appropriate affect and demeanor; normal speech pattern; grossly normal memory;         Procedures:     Acute bronchitis     1. Kenalog 40 mg given IM in the left hip; administered by [facility];  lot number [id number]; expires 2/20     Nebulizer: Medication: Douneb 1 treatments were performed on patient. Pre-treatment Pulse: 60; O2 Sat: 97 Post-treatment Pulse:68; O2 Sat: 98 Lot# [id number]  Expiration:11/19 Was treatment tolerated? yes; Administered by:[facility]             ASSESSMENT:           466.0   J20.9  Acute bronchitis              DDx:         ORDERS:         Meds Prescribed:       Medrol (Methylprednisolone) 4mg Dosepak Take as directed with food  #1 (One) dose pack Refills: 0       Guaifenesin/Phenylephrine/Dextromethorphan 200mg/10mg/18mg per 15ml Oral Liquid Take 15 ml by mouth q4h Do not exceed 6 doses in 24 hrs.  #240 (Two [place] [employer] [place]) ml Refills: 0       Albuterol 0.083% Nebulizer Solution 1 vial q 4 hours prn  #24  (Twenty Four) vial(s) Refills: 0         Procedures Ordered:       34985  Pressureized or nonpressurized inhalation treatment for acute airway obstruction or for sputum induc  (In-House)           Other Orders:       64334  Therapeutic injection  (In-House)           Kenalog, per 10 mg (x4)         Albuterol, 2.5mg & ipratropium bromide, 0.5 mg, FDA final, non-compound admin DME (x3)                 PLAN:          Acute bronchitis     Steroids Kenalog 40 mg 1 ml School/Work Excuse for Today Tomorrow           Prescriptions:       Medrol (Methylprednisolone) 4mg Dosepak Take as directed with food  #1 (One) dose pack Refills: 0       Guaifenesin/Phenylephrine/Dextromethorphan 200mg/10mg/18mg per 15ml Oral Liquid Take 15 ml by mouth q4h Do not exceed 6 doses in 24 hrs.  #240 (Two [place] and [place]) ml Refills: 0       Albuterol 0.083% Nebulizer Solution 1 vial q 4 hours prn  #24 (Twenty Four) vial(s) Refills: 0           Orders:       75332  Therapeutic injection  (In-House)                     Kenalog, per 10 mg (x4)       56863  Pressureized or nonpressurized inhalation treatment for acute airway obstruction or for sputum induc  (In-House)                     Albuterol, 2.5mg & ipratropium bromide, 0.5 mg, FDA final, non-compound admin DME (x3)             CHARGE CAPTURE:           Primary Diagnosis:     466.0 Acute bronchitis            J20.9    Acute bronchitis, unspecified              Orders:          65866   Office/outpatient visit; established patient, level 3  (In-House)             75466   Therapeutic injection  (In-House)                                           Kenalog, per 10 mg (x4)           73761   Pressureized or nonpressurized inhalation treatment for acute airway obstruction or for sputum induc  (In-House)                                           Albuterol, 2.5mg & ipratropium bromide, 0.5 mg, FDA final, non-compound admin DME (x3)

## 2021-06-03 ENCOUNTER — HOSPITAL ENCOUNTER (OUTPATIENT)
Dept: CT IMAGING | Facility: HOSPITAL | Age: 66
Discharge: HOME | End: 2021-06-03
Admitting: SURGERY

## 2021-06-03 DIAGNOSIS — S63.611A SPRAIN OF LEFT INDEX FINGER, UNSPECIFIED SITE OF DIGIT, INITIAL ENCOUNTER: ICD-10-CM

## 2021-06-03 PROCEDURE — 73200 CT UPPER EXTREMITY W/O DYE: CPT

## 2021-06-03 PROCEDURE — 76376 3D RENDER W/INTRP POSTPROCES: CPT

## 2021-06-05 NOTE — PROGRESS NOTES
Progress Note      Patient Name: Med Forbes   Patient ID: [id number]   Sex: Male   YOB: 1955    Primary Care Provider: Latasha RALPH   Referring Provider: Austen Sadler PA-C    Visit Date: May 14, 2021    Provider: Domonique Selby PA-C   Location: [facility] [place]   Location Address: [address] [place] [address]  [id number]   Location Phone: [phone]          Chief Complaint  · Follow-up     Patient here for a 4 week follow up after xrays of lumbar spine on 05/14       History Of Present Illness  The patient is a 65 year old /White male who is in the office for followup appointment.      Here for w/c f/u visit for his L1 inferior endplate compression fracture that is stable on xrays from today.  Old L3 compression deformity.  Minimal back pain.  Has still been wearing his TLSO brace.       Past Medical History  Arthritis; Heart attack; High blood pressure; High cholesterol; Leg pain; Leg swelling; Low back pain; Thyroid Disease         Past Surgical History  heart surgery         Medication List  aspirin 325 mg oral tablet; gabapentin 300 mg oral capsule; levothyroxine 150 mcg oral tablet; lisinopril 20 mg oral tablet; methocarbamol 500 mg oral tablet; sertraline 100 mg oral tablet         Allergy List  NO KNOWN DRUG ALLERGIES       Allergies Reconciled  Family Medical History  Heart Disease; Arthrtis         Social History  Tobacco (Former)         Review of Systems  · Constitutional  o Admits  o : fatigue, weight gain  · Eyes  o Admits  o : blurred vision, changes in vision  · HENT  o Admits  o : nasal congestion  · Respiratory  o Admits  o : shortness of breath  · Neurologic  o Admits  o : loss of balance, falls, difficulty with sleep, stroke, numbness/tingling/paresthesia, difficulty with coordination, weakness  · Musculoskeletal  o Admits  o : joint pain, pain radiating to leg, neck stiffness/pain, low back  "pain  · Endocrine  o Admits  o : thyroid disorder  · Psychiatric  o Admits  o : anxiety, depression  · All Others Negative      Vitals  Date Time BP Position Site L\R Cuff Size HR RR TEMP (F) WT  HT  BMI kg/m2 BSA m2 O2 Sat FR L/min FiO2 HC       05/14/2021 09:28 /93 Sitting    58 - R 116 97 213lbs 16oz 5'  10\" 30.71 2.19 97 %            Physical Examination  · Constitutional  o Appearance  o : well-nourished, well developed, alert, in no acute distress  · Respiratory  o Respiratory Effort  o : breathing unlabored  · Cardiovascular  o Peripheral Vascular System  o :   § Extremities  § : no cyanosis, clubbing or edema  · Neurologic  o Mental Status Examination  o :   § Orientation  § : grossly oriented to person, place and time  o Sensation  o :   § Light Touch  § : sensation intact to light touch in extremities  o Gait and Station  o :   § Gait Screening  § : gait within normal limits  · Psychiatric  o Mood and Affect  o : mood normal, affect appropriate     Wearing TLSO brace           Assessment  · Lumbar compression fracture     805.4/S32.000A  L1      Plan  · Orders  o PHYSICAL THERAPY CONSULTATION (PHYTH) - 805.4/S32.000A - 05/14/2021   Healed L1 compression fracture Please work on back strengthening 2 times a wee x 4 weeks Evaluate and treat  · Medications  o Medications have been Reconciled  o Transition of Care or Provider Policy  · Instructions  o Encouraged to follow-up with Primary Care Provider for preventative care.  o The ROS and the PFSH were reviewed at today's visit.  o Call or return to office if symptoms worsen or persist.   o Wean out of TLSO brace over the next week. May RTW 5/24/21 with restrictions of no lifting greater than 20 pounds for two weeks then may work without restrictions. F/U here in 4 weeks and I anticipate MMI at that visit. Start PT.             Electronically Signed by: LISA SinghC -Author on May 14, 2021 09:42:34 AM  "

## 2021-07-01 VITALS
WEIGHT: 206.8 LBS | TEMPERATURE: 98 F | DIASTOLIC BLOOD PRESSURE: 67 MMHG | BODY MASS INDEX: 29.6 KG/M2 | SYSTOLIC BLOOD PRESSURE: 152 MMHG | HEIGHT: 70 IN | HEART RATE: 56 BPM

## 2021-07-01 VITALS
TEMPERATURE: 98 F | BODY MASS INDEX: 29.68 KG/M2 | HEIGHT: 70 IN | DIASTOLIC BLOOD PRESSURE: 82 MMHG | HEART RATE: 72 BPM | SYSTOLIC BLOOD PRESSURE: 137 MMHG | WEIGHT: 207.3 LBS

## 2021-07-01 VITALS
WEIGHT: 213.4 LBS | HEART RATE: 52 BPM | DIASTOLIC BLOOD PRESSURE: 73 MMHG | SYSTOLIC BLOOD PRESSURE: 155 MMHG | BODY MASS INDEX: 30.55 KG/M2 | TEMPERATURE: 97.9 F | HEIGHT: 70 IN

## 2021-07-01 VITALS
SYSTOLIC BLOOD PRESSURE: 121 MMHG | HEIGHT: 70 IN | TEMPERATURE: 98 F | WEIGHT: 206.6 LBS | DIASTOLIC BLOOD PRESSURE: 68 MMHG | HEART RATE: 61 BPM | BODY MASS INDEX: 29.58 KG/M2

## 2021-07-13 ENCOUNTER — OFFICE VISIT (OUTPATIENT)
Dept: NEUROSURGERY | Facility: CLINIC | Age: 66
End: 2021-07-13

## 2021-07-13 VITALS
OXYGEN SATURATION: 100 % | HEIGHT: 70 IN | TEMPERATURE: 97.8 F | SYSTOLIC BLOOD PRESSURE: 126 MMHG | DIASTOLIC BLOOD PRESSURE: 75 MMHG | BODY MASS INDEX: 29.48 KG/M2 | HEART RATE: 56 BPM | WEIGHT: 205.9 LBS

## 2021-07-13 DIAGNOSIS — M54.50 CHRONIC MIDLINE LOW BACK PAIN WITHOUT SCIATICA: ICD-10-CM

## 2021-07-13 DIAGNOSIS — G89.29 CHRONIC MIDLINE LOW BACK PAIN WITHOUT SCIATICA: ICD-10-CM

## 2021-07-13 DIAGNOSIS — S32.010D CLOSED COMPRESSION FRACTURE OF L1 LUMBAR VERTEBRA WITH ROUTINE HEALING, SUBSEQUENT ENCOUNTER: Primary | ICD-10-CM

## 2021-07-13 PROCEDURE — 99213 OFFICE O/P EST LOW 20 MIN: CPT | Performed by: NURSE PRACTITIONER

## 2021-07-13 RX ORDER — GABAPENTIN 300 MG/1
CAPSULE ORAL
COMMUNITY

## 2021-07-13 RX ORDER — LISINOPRIL 20 MG/1
TABLET ORAL
COMMUNITY

## 2021-07-13 RX ORDER — ATORVASTATIN CALCIUM 40 MG/1
40 TABLET, FILM COATED ORAL DAILY
COMMUNITY

## 2021-07-13 RX ORDER — SERTRALINE HYDROCHLORIDE 100 MG/1
TABLET, FILM COATED ORAL
COMMUNITY

## 2021-07-13 RX ORDER — METHOCARBAMOL 500 MG/1
TABLET, FILM COATED ORAL
COMMUNITY

## 2021-07-13 RX ORDER — LEVOTHYROXINE SODIUM 0.15 MG/1
TABLET ORAL
COMMUNITY

## 2021-07-13 RX ORDER — ASPIRIN 325 MG
TABLET ORAL
COMMUNITY

## 2021-07-13 NOTE — PROGRESS NOTES
"Chief Complaint  Back Pain and Follow-up (previous L1 compression fx, sent for PT)    Subjective          Med Forbes who is a 65 y.o. year old male who presents to [facility] for follow up after PT follow L1 compression fracture.    He has completed physical therapy. Responded well to treatment. He is no longer wearing his TLSO brace. He has retired from his job as he is unable to continuous bending, twisting, and lifting causing him pain. He rates his pain 4/10 at baseline. Will significantly increase his pain.       Interval History per CHRISTIANO Singh 5/14/21  The patient is a 65 year old /White male who is in the office for followup appointment.       Here for w/c f/u visit for his L1 inferior endplate compression fracture that is stable on xrays from today.  Old L3 compression deformity.  Minimal back pain.  Has still been wearing his TLSO brace.       Recent Interventions: Physical therapy      Review of Systems   Musculoskeletal: Positive for arthralgias, back pain and gait problem.   Neurological: Positive for weakness.   All other systems reviewed and are negative.       Objective   Vital Signs:   /75   Pulse 56   Temp 97.8 °F (36.6 °C)   Ht 177.8 cm (70\")   Wt 93.4 kg (205 lb 14.4 oz)   SpO2 100%   BMI 29.54 kg/m²       Physical Exam  Vitals reviewed.   Constitutional:       Appearance: Normal appearance.   Neurological:      Mental Status: He is alert and oriented to person, place, and time.      Gait: Gait is intact.      Deep Tendon Reflexes: Strength normal.        Neurologic Exam     Mental Status   Oriented to person, place, and time.   Level of consciousness: alert    Motor Exam   Muscle bulk: normal  Overall muscle tone: normal    Strength   Strength 5/5 throughout.     Sensory Exam   Light touch normal.     Gait, Coordination, and Reflexes     Gait  Gait: normal       Result Review :     XR Lumbar Spine on 5/14/21 shows " stable anterior wedging of L1 and compression deformity of superior endplate of L3.             Assessment and Plan    Diagnoses and all orders for this visit:    1. Closed compression fracture of L1 lumbar vertebra with routine healing, subsequent encounter (Primary)    2. Chronic midline low back pain without sciatica    Pt has completed PT. At this point he has reached MMI following his work injury. He has continued pain symptoms in the back, increased with activity and is no longer able to continue his prior job due to pain and mobility restrictions. He has retired and is no longer working. He can follow up on an as needed basis.     Total time spent with patient and  25 minutes, with time spent reviewing patient's medical history related to his work injury, diagnostic imaging, providing education to patient regarding maximum medical improvement, goals of care. All questions answered.     Follow Up   Return if symptoms worsen or fail to improve.  Patient was given instructions and counseling regarding his condition or for health maintenance advice.     -Reached MMI  -Follow up as needed

## 2021-07-15 VITALS
WEIGHT: 214 LBS | DIASTOLIC BLOOD PRESSURE: 93 MMHG | OXYGEN SATURATION: 97 % | SYSTOLIC BLOOD PRESSURE: 182 MMHG | TEMPERATURE: 97 F | HEIGHT: 70 IN | BODY MASS INDEX: 30.64 KG/M2 | HEART RATE: 58 BPM

## 2021-07-26 ENCOUNTER — TELEPHONE (OUTPATIENT)
Dept: NEUROSURGERY | Facility: CLINIC | Age: 66
End: 2021-07-26

## 2021-07-26 NOTE — TELEPHONE ENCOUNTER
Caller: PAOLA    Relationship:     Best call back number: [phone]    What form or medical record are you requesting: FAX COVER PAGE FOR MMI AND IMPAIRMENT RATING.     Who is requesting this form or medical record from you:   How would you like to receive the form or medical records (pick-up, mail, fax): FAX  If fax, what is the fax number:   [phone]    Timeframe paperwork needed: ASAP    Additional notes:    PAOLA THE PATIENT'S  CALLED.     SHE STATED THAT SHE HAD PREVIOUSLY GIVEN THE FAX COVER PAGE FOR THE MMI AND IMPAIRMENT RATING TO BE FILLED OUT.     SHE WOULD LIKE THESE TO BE FAXED -[phone]

## 2021-08-09 NOTE — TELEPHONE ENCOUNTER
Caller: PAOLA    Relationship: NURSE     Best call back number: [phone]    What form or medical record are you requesting:FAX COVER WITH MMI AND HIS IMPAIRMENT RATING    Who is requesting this form or medical record from you: PTS [employer].    How would you like to receive the form or medical records (pick-up, mail, fax):FAX  If fax, what is the fax number:[phone]  If mail, what is the address:  If pick-up, provide patient with address and location details    Timeframe paperwork needed:ASAP    Additional notes:PAOLA THE WORK COMP. NURSE CARE MANAGER CALLED AND WAS ASKING ABOUT PAPERWORK THAT SHE HAD GIVEN TO SLIME CLEMENTE ON THE LAST VISIT WITH THE PT. PAOLA IS REQUESTING THIS PAPERWORK TO BE SENT TO HER VIA FAX. PLEASE ADVISE THANK YOU

## 2021-08-13 ENCOUNTER — TELEPHONE (OUTPATIENT)
Dept: NEUROSURGERY | Facility: CLINIC | Age: 66
End: 2021-08-13

## 2021-08-13 NOTE — TELEPHONE ENCOUNTER
Caller:  FOR PATIENT    Relationship to patient:     Best call back number:   CALLING TO ASK ABOUT MEDICAL RECORDS REQUEST SENT, WARM TRANSFERRED TO OFFICE AND WAS TOLD TO GIVE THEM SHARE CARE # [phone]

## 2023-07-27 ENCOUNTER — HOSPITAL ENCOUNTER (OUTPATIENT)
Dept: GENERAL RADIOLOGY | Facility: HOSPITAL | Age: 68
Discharge: HOME | End: 2023-07-27
Admitting: ORTHOPAEDIC SURGERY
Payer: OTHER GOVERNMENT

## 2023-07-27 ENCOUNTER — OFFICE VISIT (OUTPATIENT)
Dept: ORTHOPEDIC SURGERY | Facility: CLINIC | Age: 68
End: 2023-07-27
Payer: OTHER GOVERNMENT

## 2023-07-27 VITALS — HEIGHT: 66 IN | BODY MASS INDEX: 28.61 KG/M2 | TEMPERATURE: 98.3 F | WEIGHT: 178 LBS

## 2023-07-27 DIAGNOSIS — G89.29 CHRONIC RADICULAR LUMBAR PAIN: ICD-10-CM

## 2023-07-27 DIAGNOSIS — M25.561 RIGHT KNEE PAIN, UNSPECIFIED CHRONICITY: ICD-10-CM

## 2023-07-27 DIAGNOSIS — M17.31 POST-TRAUMATIC OSTEOARTHRITIS OF RIGHT KNEE: Primary | ICD-10-CM

## 2023-07-27 DIAGNOSIS — M54.16 CHRONIC RADICULAR LUMBAR PAIN: ICD-10-CM

## 2023-07-27 PROBLEM — I21.9 MYOCARDIAL INFARCTION: Status: ACTIVE | Noted: 2023-07-27

## 2023-07-27 PROBLEM — Z86.73 PERSONAL HISTORY OF TRANSIENT ISCHEMIC ATTACK (TIA), AND CEREBRAL INFARCTION WITHOUT RESIDUAL DEFICITS: Status: ACTIVE | Noted: 2020-12-11

## 2023-07-27 PROBLEM — E78.00 HIGH CHOLESTEROL: Status: ACTIVE | Noted: 2020-12-11

## 2023-07-27 PROBLEM — I10 ESSENTIAL HYPERTENSION: Status: ACTIVE | Noted: 2020-12-11

## 2023-07-27 PROBLEM — E07.9 DISORDER OF THYROID: Status: ACTIVE | Noted: 2023-07-27

## 2023-07-27 PROBLEM — F32.A DEPRESSIVE DISORDER: Status: ACTIVE | Noted: 2023-07-27

## 2023-07-27 PROBLEM — I63.9: Status: ACTIVE | Noted: 2020-12-11

## 2023-07-27 PROBLEM — M54.50 LOW BACK PAIN: Status: ACTIVE | Noted: 2023-07-27

## 2023-07-27 PROBLEM — M19.90 ARTHRITIS: Status: ACTIVE | Noted: 2023-07-27

## 2023-07-27 PROCEDURE — 99204 OFFICE O/P NEW MOD 45 MIN: CPT | Performed by: ORTHOPAEDIC SURGERY

## 2023-07-27 PROCEDURE — 73560 X-RAY EXAM OF KNEE 1 OR 2: CPT

## 2023-07-27 NOTE — PROGRESS NOTES
"Chief Complaint  Establish Care of the Right Knee and Establish Care of the Left Knee    Subjective    History of Present Illness      Med Forbes is a 67 y.o. male who presents to [facility] for chronic low back pain and severe right knee pain.  History of Present Illness this is a patient who is here for second opinion.  He was treated by Dr. Guicho shields for a proximal tibial plateau fracture at least 20 years ago.  He was placed into a cast at that point.  He has slowly and progressively developed a varus deformity of the knee.  He also is seen at the [facility] in [facility] and receives intra-articular injections most likely of steroid to help manage his symptoms.  He states that he receives shots 2-3 times a year in the do help him in minimizing his pain and discomfort and to manage his symptoms.  He states that his pain is getting progressively worse and he rates it as a severe pain.  He grades his pain as a 10 on a scale of 1-10.  Symptoms are worse when he goes up and down the steps.  Symptoms of gotten worse over the past 6 months or so.  Pain Location:  BILATERAL knee  Radiation: none  Quality: stabbing, grinding, shooting  Intensity/Severity: severe  Duration:  Several years  Progression of symptoms: yes, progressive worsening  Onset quality: gradual   Timing: intermittent  Aggravating Factors: going up and down stairs, rising after sitting, walking, standing, running  Alleviating Factors: NSAIDs, rest, stretching/PT/OT, cane  Previous Episodes: yes  Associated Symptoms: pain, swelling, clicking/popping, stiffness  ADLs Affected: dressing, ambulating, recreational activities/sports  Previous Treatment: NSAIDs and physical/occupational therapy       Objective   Vital Signs:   Temp 98.3 øF (36.8 øC)   Ht 167.6 cm (66\")   Wt 80.7 kg (178 lb)   BMI 28.73 kg/mý     Physical Exam  Physical Exam  Vitals signs and nursing note reviewed.   Constitutional:     "   Appearance: Normal appearance.   Pulmonary:      Effort: Pulmonary effort is normal.   Skin:     General: Skin is warm and dry.      Capillary Refill: Capillary refill takes less than 2 seconds.   Neurological:      General: No focal deficit present.      Mental Status: He is alert and oriented to person, place, and time. Mental status is at baseline.   Psychiatric:         Mood and Affect: Mood normal.         Behavior: Behavior normal.         Thought Content: Thought content normal.         Judgment: Judgment normal.     Ortho Exam   Right knee (varus). Patient has crepitus throughout range of motion. Positive patellar grind test. Mild effusion. Lachman is negative. Pivot shift is negative. Anterior and posterior drawer signs are negative. Significant joint line tenderness is noted on the medial aspect of the knee. Patient has a varus orientation of the knee. There is fullness and tenderness in the Popliteal fossa. Mild distention of a Popliteal cyst is noted in this location. Range of motion in flexion is from  degrees. Neurovascular status is intact.  Dorsalis pedis and posterior tibial artery pulses are palpable. Common peroneal nerve function is well preserved. Patient's gait is cautious and antalgic. Skin and soft tissues are mildly swollen, consistent with synovitis and effusion. The patient has a significant limp with the first few steps after starting the gait cycle. Getting out of a chair takes a lot of effort due to pain on knee flexion.   Lumbar Spine: The overlying skin and soft tissues are mildly swollen. Deep tendon reflexes are bilaterally, symmetric and equal.  No long tract signs are noted. There is No evidence of myelopathy. No bowel or bladder deficit noted. Mild spasm of erector spinae muscle is noted. bilaterally sided rotation is associated with pain and discomfort. Straight leg raise test is positive to 60 degrees. Contralateral straight leg raise is negative. Pain radiates to  buttock and thigh. Get up and go is slow due to the lumbar pain.No objective motor or sensory function loss is noted.          Result Review :  The following data was reviewed by: Yousuf Olguin MD on 07/27/2023:  Radiologic studies - see below for interpretation  RIGHT knee xrays  weightbearing/standing ap/lateral views were ordered by Yousuf Olguin MD. Performed at [facility] on 07/27/2023. Images were independently viewed and interpreted by myself, my impression as follows:      right Knee X-Ray  Indication: Evaluation of pain and discomfort in the knee.  AP, Lateral views  Findings: The patient has evidence of a healed lateral tibial plateau fracture with some depression of the articular surface.  The joint space is significantly narrowed.  Patellofemoral distance is also significantly narrowed.  He has an enchondroma of the distal femur which appears to be benign and does not demonstrate any tendencies of an aggressive bone lesion.  yes bony lesion: He has an enchondroma of the distal femur.  Soft tissues within normal limits  decreased joint spaces  Hardware appropriately positioned not applicable      no prior studies available for comparison.    This patient's x-ray report was graded according to the Kellgren and Jez classification.  This took into account the joint space narrowing, osteophyte formation, sclerosis of the distal femur/proximal tibia along with deformity of those bones.  The findings were indicative of K L grade 3.    X-RAY was ordered and reviewed by Yousuf Olguin MD       Procedures           Assessment   Assessment and Plan    Diagnoses and all orders for this visit:    1. Post-traumatic osteoarthritis of right knee (Primary)    2. Chronic radicular lumbar pain          Follow Up   Compression/brace to prevent the knee from buckling and giving out.  Calcium and vitamin D for bone health.  Glucosamine, chondroitin and turmeric for cartilage health.  Discussed with  the patient about proceeding with total knee arthroplasty and he will talk to his family and let me know when he is ready for that form of surgical intervention.  Risks and benefits of total knee arthroplasty discussed with the patient at length to the extent of 45 minutes.  We also spent a lot of time reviewing the patient's medical records from [facility].  The patient was seen today for preoperative discussion.  The patient has been tried on over-the-counter and prescription NSAID's despite the risks of anti-inflammatory bleeding, peptic ulcers and erosive gastritis with short term benefit only.  Braces have been prescribed for mechanical support.  Patient has been participating in an exercise program specifically targeting joint pain relief with limited benefit. Intraarticular injections have been used periodically with some but not complete relief of pain.  Ambulation aids have also been utilized.      The details of the surgical procedure were explained including the location of probable incisions and a description of the likely hardware/grafts to be used. The patient understands the likely convalescence after surgery as well as the rehabilitation required.  Also, we have thoroughly discussed with the patient the risks, benefits and alternatives to surgery.  Risks include but are not limited to the risk of infection, joint stiffness, limited range of motion, wound healing problems, scar tissue build up, myocardial infarction, stroke, blood clots (including DVT and/or pulmonary embolus along with the risk of death) neurologic and/or vascular injury, limb length discrepancy, fracture, dislocation, nonunion, malunion, continued pain and need for further surgery including hardware failure requiring revision.    Rest, ice, compression, and elevation (RICE) therapy  Stretching and strengthening exercises  OTC Alternate Ibuprofen and Tylenol as needed  Patient will eventually need a Right Total Knee  Arthroplasty  Follow up in 1 year(s) with x-rays   Patient was given instructions and counseling regarding his condition or for health maintenance advice. Please see specific information pulled into the AVS if appropriate.     Yousuf Olguin MD   Date of Encounter: 7/27/2023   Electronically signed by Yousuf Olguin MD, 07/27/23, 9:19 AM EDT.     EMR Dragon/Transcription disclaimer:  Much of this encounter note is an electronic transcription/translation of spoken language to printed text. The electronic translation of spoken language may permit erroneous, or at times, nonsensical words or phrases to be inadvertently transcribed; Although I have reviewed the note for such errors, some may still exist.

## 2023-08-08 PROBLEM — G89.29 CHRONIC RADICULAR LUMBAR PAIN: Status: ACTIVE | Noted: 2023-08-08

## 2023-08-08 PROBLEM — M17.31 POST-TRAUMATIC OSTEOARTHRITIS OF RIGHT KNEE: Status: ACTIVE | Noted: 2023-08-08

## 2023-08-08 PROBLEM — M54.16 CHRONIC RADICULAR LUMBAR PAIN: Status: ACTIVE | Noted: 2023-08-08
